# Patient Record
Sex: MALE | Race: WHITE
[De-identification: names, ages, dates, MRNs, and addresses within clinical notes are randomized per-mention and may not be internally consistent; named-entity substitution may affect disease eponyms.]

---

## 2018-05-01 ENCOUNTER — HOSPITAL ENCOUNTER (EMERGENCY)
Dept: HOSPITAL 17 - NEPE | Age: 38
Discharge: HOME | End: 2018-05-01
Payer: COMMERCIAL

## 2018-05-01 VITALS
OXYGEN SATURATION: 100 % | RESPIRATION RATE: 17 BRPM | DIASTOLIC BLOOD PRESSURE: 75 MMHG | SYSTOLIC BLOOD PRESSURE: 142 MMHG | HEART RATE: 86 BPM

## 2018-05-01 VITALS
HEART RATE: 78 BPM | TEMPERATURE: 97.2 F | DIASTOLIC BLOOD PRESSURE: 60 MMHG | RESPIRATION RATE: 20 BRPM | OXYGEN SATURATION: 98 % | SYSTOLIC BLOOD PRESSURE: 145 MMHG

## 2018-05-01 VITALS — HEIGHT: 70 IN | BODY MASS INDEX: 37.87 KG/M2 | WEIGHT: 264.55 LBS

## 2018-05-01 VITALS — OXYGEN SATURATION: 100 %

## 2018-05-01 DIAGNOSIS — N20.0: Primary | ICD-10-CM

## 2018-05-01 LAB
ALBUMIN SERPL-MCNC: 3.5 GM/DL (ref 3.4–5)
ALP SERPL-CCNC: 89 U/L (ref 45–117)
ALT SERPL-CCNC: 47 U/L (ref 12–78)
AST SERPL-CCNC: 35 U/L (ref 15–37)
BACTERIA #/AREA URNS HPF: (no result) /HPF
BASOPHILS # BLD AUTO: 0.1 TH/MM3 (ref 0–0.2)
BASOPHILS NFR BLD: 0.4 % (ref 0–2)
BILIRUB SERPL-MCNC: 0.3 MG/DL (ref 0.2–1)
BUN SERPL-MCNC: 14 MG/DL (ref 7–18)
CALCIUM SERPL-MCNC: 8.2 MG/DL (ref 8.5–10.1)
CHLORIDE SERPL-SCNC: 105 MEQ/L (ref 98–107)
COLOR UR: YELLOW
CREAT SERPL-MCNC: 1.16 MG/DL (ref 0.6–1.3)
EOSINOPHIL # BLD: 0.2 TH/MM3 (ref 0–0.4)
EOSINOPHIL NFR BLD: 1.3 % (ref 0–4)
ERYTHROCYTE [DISTWIDTH] IN BLOOD BY AUTOMATED COUNT: 13.5 % (ref 11.6–17.2)
GFR SERPLBLD BASED ON 1.73 SQ M-ARVRAT: 71 ML/MIN (ref 89–?)
GLUCOSE SERPL-MCNC: 121 MG/DL (ref 74–106)
GLUCOSE UR STRIP-MCNC: (no result) MG/DL
HCO3 BLD-SCNC: 24.3 MEQ/L (ref 21–32)
HCT VFR BLD CALC: 41 % (ref 39–51)
HGB BLD-MCNC: 13.3 GM/DL (ref 13–17)
HGB UR QL STRIP: (no result)
KETONES UR STRIP-MCNC: (no result) MG/DL
LYMPHOCYTES # BLD AUTO: 4.7 TH/MM3 (ref 1–4.8)
LYMPHOCYTES NFR BLD AUTO: 37.4 % (ref 9–44)
MCH RBC QN AUTO: 24.6 PG (ref 27–34)
MCHC RBC AUTO-ENTMCNC: 32.4 % (ref 32–36)
MCV RBC AUTO: 75.9 FL (ref 80–100)
MONOCYTE #: 1.4 TH/MM3 (ref 0–0.9)
MONOCYTES NFR BLD: 11.4 % (ref 0–8)
MUCOUS THREADS #/AREA URNS LPF: (no result) /LPF
NEUTROPHILS # BLD AUTO: 6.2 TH/MM3 (ref 1.8–7.7)
NEUTROPHILS NFR BLD AUTO: 49.5 % (ref 16–70)
NITRITE UR QL STRIP: (no result)
PLATELET # BLD: 454 TH/MM3 (ref 150–450)
PMV BLD AUTO: 8.3 FL (ref 7–11)
PROT SERPL-MCNC: 7.7 GM/DL (ref 6.4–8.2)
RBC # BLD AUTO: 5.4 MIL/MM3 (ref 4.5–5.9)
SODIUM SERPL-SCNC: 140 MEQ/L (ref 136–145)
SP GR UR STRIP: 1.02 (ref 1–1.03)
SQUAMOUS #/AREA URNS HPF: <1 /HPF (ref 0–5)
URINE LEUKOCYTE ESTERASE: (no result)
WBC # BLD AUTO: 12.4 TH/MM3 (ref 4–11)

## 2018-05-01 PROCEDURE — 81001 URINALYSIS AUTO W/SCOPE: CPT

## 2018-05-01 PROCEDURE — 99284 EMERGENCY DEPT VISIT MOD MDM: CPT

## 2018-05-01 PROCEDURE — 80053 COMPREHEN METABOLIC PANEL: CPT

## 2018-05-01 PROCEDURE — 96376 TX/PRO/DX INJ SAME DRUG ADON: CPT

## 2018-05-01 PROCEDURE — 96374 THER/PROPH/DIAG INJ IV PUSH: CPT

## 2018-05-01 PROCEDURE — 96361 HYDRATE IV INFUSION ADD-ON: CPT

## 2018-05-01 PROCEDURE — 83690 ASSAY OF LIPASE: CPT

## 2018-05-01 PROCEDURE — 85025 COMPLETE CBC W/AUTO DIFF WBC: CPT

## 2018-05-01 PROCEDURE — 74176 CT ABD & PELVIS W/O CONTRAST: CPT

## 2018-05-01 PROCEDURE — 96375 TX/PRO/DX INJ NEW DRUG ADDON: CPT

## 2018-05-01 NOTE — RADRPT
EXAM DATE/TIME:  05/01/2018 13:03 

 

[facility] COMPARISON:     

No previous studies available for comparison.

 

 

INDICATIONS :     

Right side abdominal pain. 

                  

 

ORAL CONTRAST:      

No oral contrast ingested.

                  

 

RADIATION DOSE:     

22.40 CTDIvol (mGy) 

 

 

MEDICAL HISTORY :     

None  

 

SURGICAL HISTORY :      

None. 

 

ENCOUNTER:      

Initial

 

ACUITY:      

1 day

 

PAIN SCALE:      

5/10

 

LOCATION:       

Right flank 

 

TECHNIQUE:     

Renal colic protocol.  Volumetric scanning of the abdomen and pelvis was performed.  Using automated 
exposure control and adjustment of the mA and/or kV according to patient size, radiation dose was kep
t as low as reasonably achievable to obtain optimal diagnostic quality images.  DICOM format image da
ta is available electronically for review and comparison.  

 

FINDINGS:     

 

Right side: 

No calcifications within the collecting system.  There is mild dilation of the collecting system and 
right ureter down to the ureterovesical junction.  There is a tiny (less than 2 mm) calcification at 
the ureterovesical junction.

 

Left side: 

No evidence of hydronephrosis.  2 mm calcified stone in the lower pole collecting system.  No calcifi
cations in the left ureter.

 

Bladder: 

Smooth margins.  No calcifications within the lumen.

 

Other: 

No dilated loops of small or large bowel.  No calcified gallstones.

 

CONCLUSION:     

1. Tiny obstructing stone at the right ureterovesical junction causing mild dilation of the ureter an
d collecting system.

2. Nonobstructing tiny stone in the lower pole collecting system of the left kidney.

 

 

 

 Natan Perez MD on May 01, 2018 at 13:32           

Board Certified Radiologist.

 This report was verified electronically.

## 2018-05-01 NOTE — PD
HPI


Chief Complaint:  Abdominal Pain


Time Seen by Provider:  12:21


Travel History


International Travel<30 days:  No


Contact w/Intl Traveler<30days:  No


Traveled to known affect area:  No





History of Present Illness


HPI


37 Youman presents emerged recommend abrupt onset of right inguinal pain 

starting yesterday associate with nausea, no vomiting, with constant severe 

pain.  He has never had similar symptoms in the past.  He otherwise had been 

feeling well and healthy before the onset of the symptoms.





History


Past Medical History


Medical History:  Denies Significant Hx





Social History


Alcohol Use:  No


Tobacco Use:  No





Allergies-Medications


(Allergen,Severity, Reaction):  


Coded Allergies:  


     No Known Allergies (Unverified , 5/1/18)





Review of Systems


Except as stated in HPI:  all other systems reviewed are Neg





Physical Exam


Narrative


GENERAL: 37-year-old man, writhing in bed, very uncomfortable peer


SKIN: Focused skin assessment warm/dry.


HEAD: Atraumatic. Normocephalic. 


EYES: Pupils equal and round. No scleral icterus. No injection or drainage. 


ENT: No nasal bleeding or discharge.  Mucous membranes pink and moist.


NECK: Trachea midline. No JVD. 


CARDIOVASCULAR: Regular rate and rhythm.  No murmur appreciated.


RESPIRATORY: No accessory muscle use. Clear to auscultation. Breath sounds 

equal bilaterally. 


GASTROINTESTINAL: Abdomen is obese, soft, mild right lower quadrant abdominal 

tenderness.


MUSCULOSKELETAL: No obvious deformities. No clubbing.  No cyanosis.  No edema. 


NEUROLOGICAL: Awake and alert. No obvious cranial nerve deficits.  Motor 

grossly within normal limits. Normal speech.


PSYCHIATRIC: Appropriate mood and affect; insight and judgment normal.





Data


Data


Last Documented VS





Vital Signs








  Date Time  Temp Pulse Resp B/P (MAP) Pulse Ox O2 Delivery O2 Flow Rate FiO2


 


5/1/18 12:30     100 Room Air  


 


5/1/18 11:47 97.2 78 20 145/60 (88)    








Orders





 Orders


Complete Blood Count With Diff (5/1/18 12:21)


Comprehensive Metabolic Panel (5/1/18 12:21)


Lipase (5/1/18 12:21)


Urinalysis - C+S If Indicated (5/1/18 12:21)


Iv Access Insert/Monitor (5/1/18 12:21)


Ecg Monitoring (5/1/18 12:21)


Oximetry (5/1/18 12:21)


Morphine Inj (Morphine Inj) (5/1/18 12:30)


Ondansetron Inj (Zofran Inj) (5/1/18 12:30)


Sodium Chlor 0.9% 1000 Ml Inj (Ns 1000 M (5/1/18 12:21)


Sodium Chloride 0.9% Flush (Ns Flush) (5/1/18 12:30)


Ct Abd/Pel W/O Iv Contrast (5/1/18 )


Morphine Inj (Morphine Inj) (5/1/18 12:30)


Ketorolac Inj (Toradol Inj) (5/1/18 12:30)





Labs





Laboratory Tests








Test


  5/1/18


12:30 5/1/18


14:22


 


White Blood Count 12.4 TH/MM3  


 


Red Blood Count 5.40 MIL/MM3  


 


Hemoglobin 13.3 GM/DL  


 


Hematocrit 41.0 %  


 


Mean Corpuscular Volume 75.9 FL  


 


Mean Corpuscular Hemoglobin 24.6 PG  


 


Mean Corpuscular Hemoglobin


Concent 32.4 % 


  


 


 


Red Cell Distribution Width 13.5 %  


 


Platelet Count 454 TH/MM3  


 


Mean Platelet Volume 8.3 FL  


 


Neutrophils (%) (Auto) 49.5 %  


 


Lymphocytes (%) (Auto) 37.4 %  


 


Monocytes (%) (Auto) 11.4 %  


 


Eosinophils (%) (Auto) 1.3 %  


 


Basophils (%) (Auto) 0.4 %  


 


Neutrophils # (Auto) 6.2 TH/MM3  


 


Lymphocytes # (Auto) 4.7 TH/MM3  


 


Monocytes # (Auto) 1.4 TH/MM3  


 


Eosinophils # (Auto) 0.2 TH/MM3  


 


Basophils # (Auto) 0.1 TH/MM3  


 


CBC Comment AUTO DIFF  


 


Differential Comment


  AUTO DIFF


CONFIRMED 


 


 


Blood Urea Nitrogen 14 MG/DL  


 


Creatinine 1.16 MG/DL  


 


Random Glucose 121 MG/DL  


 


Total Protein 7.7 GM/DL  


 


Albumin 3.5 GM/DL  


 


Calcium Level 8.2 MG/DL  


 


Alkaline Phosphatase 89 U/L  


 


Aspartate Amino Transf


(AST/SGOT) 35 U/L 


  


 


 


Alanine Aminotransferase


(ALT/SGPT) 47 U/L 


  


 


 


Total Bilirubin 0.3 MG/DL  


 


Sodium Level 140 MEQ/L  


 


Potassium Level 4.0 MEQ/L  


 


Chloride Level 105 MEQ/L  


 


Carbon Dioxide Level 24.3 MEQ/L  


 


Anion Gap 11 MEQ/L  


 


Estimat Glomerular Filtration


Rate 71 ML/MIN 


  


 


 


Lipase 149 U/L  


 


Urine Color  YELLOW 


 


Urine Turbidity  HAZY 


 


Urine pH  5.0 


 


Urine Specific Gravity  1.019 


 


Urine Protein  TRACE mg/dL 


 


Urine Glucose (UA)  NEG mg/dL 


 


Urine Ketones  NEG mg/dL 


 


Urine Occult Blood  MOD 


 


Urine Nitrite  NEG 


 


Urine Bilirubin  NEG 


 


Urine Urobilinogen


  


  LESS THAN 2.0


MG/DL


 


Urine Leukocyte Esterase  NEG 


 


Urine RBC   /hpf 


 


Urine WBC  2 /hpf 


 


Urine Squamous Epithelial


Cells 


  <1 /hpf 


 


 


Urine Bacteria  RARE /hpf 


 


Urine Mucus  FEW /lpf 


 


Microscopic Urinalysis Comment


  


  CULT NOT


INDICATED











MDM


Medical Decision Making


Medical Screen Exam Complete:  Yes


Emergency Medical Condition:  Yes


Interpretation(s)


LABS:


CBC is remarkable for mild leukocytosis.


CMP is unremarkable.


Lipase unremarkable.


UA with significant hematuria.





Chest CT: Tiny obstructing stone at the right UVJ causing mild dilatation of 

the ureter and collecting system.


Differential Diagnosis


Renal lithiasis, UTI, torsion, hernia, other


Narrative Course


Medical decision-making





37 Youhenrietta presents the abrupt onset of right inguinal pain, severe, likely 

renal stone.  Will check labs, CT, urine, reassess.





Diagnosis





 Primary Impression:  


 Renal lithiasis





***Additional Instructions:  


Take medications as prescribed.





For the next 2-4 days.





Return to the emergency department for any worsening pain, fevers, intractable 

vomiting, or any other new or worsening symptoms.


***Med/Other Pt SpecificInfo:  Prescription(s) given


Scripts


Naproxen (Naproxen) 500 Mg Tab


500 MG PO BID, #20 TAB 0 Refills


   Prov: Akil Soares MD         5/1/18 


Oxycodone-Acetaminophen (Percocet) 5-325 mg Tab


1-2 TAB PO Q6H Y for PAIN, #12 TAB 0 Refills


   Prov: Akil Soares MD         5/1/18 


Ondansetron Odt (Ondansetron Odt) 4 Mg Tab


4 MG SL Q8HR Y for Nausea/Vomiting, #12 TAB 0 Refills


   Prov: Akil Soares MD         5/1/18


Disposition:  01 DISCHARGE HOME


Condition:  Stable











Akil Soares MD May 1, 2018 13:13